# Patient Record
Sex: FEMALE | Race: WHITE | NOT HISPANIC OR LATINO | Employment: STUDENT | URBAN - METROPOLITAN AREA
[De-identification: names, ages, dates, MRNs, and addresses within clinical notes are randomized per-mention and may not be internally consistent; named-entity substitution may affect disease eponyms.]

---

## 2024-02-08 ENCOUNTER — LAB (OUTPATIENT)
Dept: LAB | Facility: HOSPITAL | Age: 19
End: 2024-02-08
Payer: COMMERCIAL

## 2024-02-08 ENCOUNTER — OFFICE VISIT (OUTPATIENT)
Dept: FAMILY MEDICINE CLINIC | Facility: CLINIC | Age: 19
End: 2024-02-08
Payer: COMMERCIAL

## 2024-02-08 VITALS
BODY MASS INDEX: 24.14 KG/M2 | HEIGHT: 67 IN | TEMPERATURE: 98.3 F | HEART RATE: 74 BPM | SYSTOLIC BLOOD PRESSURE: 114 MMHG | OXYGEN SATURATION: 98 % | DIASTOLIC BLOOD PRESSURE: 76 MMHG | WEIGHT: 153.8 LBS

## 2024-02-08 DIAGNOSIS — F41.9 ANXIETY: ICD-10-CM

## 2024-02-08 DIAGNOSIS — F41.9 ANXIETY: Primary | ICD-10-CM

## 2024-02-08 DIAGNOSIS — G43.009 MIGRAINE WITHOUT AURA AND WITHOUT STATUS MIGRAINOSUS, NOT INTRACTABLE: ICD-10-CM

## 2024-02-08 LAB
ALBUMIN SERPL-MCNC: 4.2 G/DL (ref 3.5–5.2)
ALBUMIN/GLOB SERPL: 1.6 G/DL
ALP SERPL-CCNC: 41 U/L (ref 39–117)
ALT SERPL W P-5'-P-CCNC: 15 U/L (ref 1–33)
ANION GAP SERPL CALCULATED.3IONS-SCNC: 11.3 MMOL/L (ref 5–15)
AST SERPL-CCNC: 18 U/L (ref 1–32)
BILIRUB SERPL-MCNC: 0.4 MG/DL (ref 0–1.2)
BUN SERPL-MCNC: 11 MG/DL (ref 6–20)
BUN/CREAT SERPL: 15.7 (ref 7–25)
CALCIUM SPEC-SCNC: 9 MG/DL (ref 8.6–10.5)
CHLORIDE SERPL-SCNC: 106 MMOL/L (ref 98–107)
CO2 SERPL-SCNC: 20.7 MMOL/L (ref 22–29)
CREAT SERPL-MCNC: 0.7 MG/DL (ref 0.57–1)
EGFRCR SERPLBLD CKD-EPI 2021: 127.2 ML/MIN/1.73
GLOBULIN UR ELPH-MCNC: 2.6 GM/DL
GLUCOSE SERPL-MCNC: 80 MG/DL (ref 65–99)
POTASSIUM SERPL-SCNC: 3.9 MMOL/L (ref 3.5–5.2)
PROT SERPL-MCNC: 6.8 G/DL (ref 6–8.5)
SODIUM SERPL-SCNC: 138 MMOL/L (ref 136–145)

## 2024-02-08 PROCEDURE — 80053 COMPREHEN METABOLIC PANEL: CPT

## 2024-02-08 PROCEDURE — 99204 OFFICE O/P NEW MOD 45 MIN: CPT

## 2024-02-08 RX ORDER — ESCITALOPRAM OXALATE 10 MG/1
10 TABLET ORAL DAILY
Qty: 30 TABLET | Refills: 3 | Status: SHIPPED | OUTPATIENT
Start: 2024-02-08

## 2024-02-08 RX ORDER — SUMATRIPTAN 25 MG/1
25 TABLET, FILM COATED ORAL
Qty: 9 TABLET | Refills: 3 | Status: SHIPPED | OUTPATIENT
Start: 2024-02-08

## 2024-02-08 NOTE — PROGRESS NOTES
"     New Patient Office Visit      Date: 2024   Patient Name: Елена Dubose  : 2005   MRN: [id number]     Chief Complaint:    Chief Complaint   Patient presents with    Anxiety    New Patient Preventive Medicine Services    Migraine       History of Present Illness: Елена Dubose is a 19 y.o. female who is here today to establish care.  Patient presents today accompanied by her mother.  She would like to discuss starting anxiety medication.  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  Patient states she is always struggled with anxiety.  Denies any history of self-harm or harming others.  She states that she has noticed an increase in her anxiety due to school.  She states that she has had a hard time with sleeping and has not been able to fall asleep till 4 5 in the morning because of racing thoughts.  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  No other excessive caffeine use.  Patient was originally opposed to starting medication for anxiety, but now feels like she needs it due to it affecting her sleep and daily life.    Patient also complains of migraines.  She states that she will get migraines for a couple months at a time every 6 to 7 months.  She states that usually during the start of a semester her school year.  She states that when she does have these episodes for 1 month she will get about 3 migraines per week.  She states she does have some light sensitivity.  She states loud noises make her headaches worse.  Patient denies any associated nausea or vomiting.  Patient's mom states that she also has migraines and currently is on Elavil for prevention.  She states she has given her daughter, the patient, some sumatriptan and of hers before and it has helped take " the pain away.  Patient states that she has had palpitations in the past, but was worked up by cardiology and was assumed that likely it is related to her anxiety.          Subjective      Review of Systems:   Review of Systems   Constitutional:  Negative for chills and fever.   HENT:  Negative for congestion and hearing loss.    Respiratory:  Negative for cough, chest tightness and shortness of breath.    Cardiovascular:  Negative for chest pain and palpitations.   Gastrointestinal:  Negative for abdominal distention and abdominal pain.   Genitourinary:  Negative for menstrual problem.   Neurological:  Positive for headache. Negative for dizziness.   Psychiatric/Behavioral:  Positive for sleep disturbance. Negative for self-injury. The patient is nervous/anxious.        Past Medical History:   Past Medical History:   Diagnosis Date    Headache     Urinary tract infection        Past Surgical History:   Past Surgical History:   Procedure Laterality Date    WISDOM TOOTH EXTRACTION         Family History:   Family History   Problem Relation Age of Onset    Migraines Mother     Anxiety disorder Mother     Anxiety disorder Father        Social History:   Social History     Socioeconomic History    Marital status: Single   Tobacco Use    Smoking status: Never     Passive exposure: Never    Smokeless tobacco: Never   Vaping Use    Vaping Use: Never used   Substance and Sexual Activity    Alcohol use: Not Currently     Alcohol/week: 1.0 standard drink of alcohol     Types: 1 Glasses of wine per week    Drug use: Never    Sexual activity: Yes     Partners: Male       Medications:     Current Outpatient Medications:     adapalene (DIFFERIN) 0.3 % gel, Apply 1 application topically to the entire face as directed Daily., Disp: 45 g, Rfl: 3    benzoyl peroxide 5 % external liquid, Apply 1 application topically to the face, back & chest as directed when showering., Disp: 148 g, Rfl: 3    norethindrone-ethinyl estradiol-iron  "(Blisovi Fe 1.5/30) 1.5-30 MG-MCG tablet, Take 1 tablet by mouth Daily., Disp: 84 tablet, Rfl: 4    spironolactone (ALDACTONE) 50 MG tablet, Take 1 tablet by mouth in the morning and 1 tablet by mouth at night., Disp: 60 tablet, Rfl: 5    escitalopram (Lexapro) 10 MG tablet, Take 1 tablet by mouth Daily., Disp: 30 tablet, Rfl: 3    SUMAtriptan (IMITREX) 25 MG tablet, Take 1 tablet by mouth Every 2 Hours As Needed for Migraine.  *MAX:  200mg (8 tablets) per day*, Disp: 9 tablet, Rfl: 3    Allergies:   No Known Allergies    Objective     Physical Exam:  Vital Signs:   Vitals:    02/08/24 0829   BP: 114/76   BP Location: Right arm   Patient Position: Sitting   Cuff Size: Adult   Pulse: 74   Temp: 98.3 °F (36.8 °C)   TempSrc: Oral   SpO2: 98%   Weight: 69.8 kg (153 lb 12.8 oz)   Height: 170.2 cm (67\")   PainSc: 0-No pain     Body mass index is 24.09 kg/m².  BMI is within normal parameters. No other follow-up for BMI required.       Physical Exam  Constitutional:       Appearance: Normal appearance. She is not toxic-appearing.   HENT:      Head: Normocephalic and atraumatic.      Right Ear: Tympanic membrane, ear canal and external ear normal.      Left Ear: Tympanic membrane, ear canal and external ear normal.      Nose: Nose normal.      Mouth/Throat:      Mouth: Mucous membranes are moist.      Pharynx: No posterior oropharyngeal erythema.   Eyes:      Extraocular Movements: Extraocular movements intact.      Pupils: Pupils are equal, round, and reactive to light.   Cardiovascular:      Rate and Rhythm: Normal rate and regular rhythm.      Pulses: Normal pulses.      Heart sounds: Normal heart sounds.   Pulmonary:      Effort: Pulmonary effort is normal.      Breath sounds: Normal breath sounds. No wheezing.   Abdominal:      General: Abdomen is flat. Bowel sounds are normal.      Tenderness: There is no abdominal tenderness.   Musculoskeletal:      Cervical back: Normal range of motion.   Lymphadenopathy:      " Cervical: No cervical adenopathy.   Skin:     General: Skin is warm.   Neurological:      General: No focal deficit present.      Mental Status: She is alert and oriented to person, place, and time.   Psychiatric:         Mood and Affect: Mood normal.         Procedures    Results:   PHQ-9 Total Score: 0            Assessment / Plan      Assessment/Plan:   Diagnoses and all orders for this visit:    1. Anxiety (Primary)  Assessment & Plan:  Start Lexapro 10 mg once daily.  Patient was counseled on risks and benefits of medication.  Would like patient to return in about 2 months for her annual exam and to discuss how the medication is working for her.    Orders:  -     escitalopram (Lexapro) 10 MG tablet; Take 1 tablet by mouth Daily.  Dispense: 30 tablet; Refill: 3  -     Comprehensive Metabolic Panel; Future    2. Migraine without aura and without status migrainosus, not intractable  Assessment & Plan:  Headaches are worsening.  Medication changes per orders.  Follow up in 2 months, or sooner should new symptoms or problems arise.  Patient counseled on risk and benefits of taking sumatriptan.  Discussed she may take it once every 2 hours as needed with a max dose of 200 mg/day.     Orders:  -     SUMAtriptan (IMITREX) 25 MG tablet; Take 1 tablet by mouth Every 2 Hours As Needed for Migraine.  *MAX:  200mg (8 tablets) per day*  Dispense: 9 tablet; Refill: 3         Follow Up:   Return in about 2 months (around 4/8/2024) for Annual.    Tawana Morris PA-C   [facility]

## 2024-02-08 NOTE — ASSESSMENT & PLAN NOTE
Start Lexapro 10 mg once daily.  Patient was counseled on risks and benefits of medication.  Would like patient to return in about 2 months for her annual exam and to discuss how the medication is working for her.

## 2024-02-08 NOTE — ASSESSMENT & PLAN NOTE
Headaches are worsening.  Medication changes per orders.  Follow up in 2 months, or sooner should new symptoms or problems arise.  Patient counseled on risk and benefits of taking sumatriptan.  Discussed she may take it once every 2 hours as needed with a max dose of 200 mg/day.

## 2024-02-09 ENCOUNTER — TELEPHONE (OUTPATIENT)
Dept: FAMILY MEDICINE CLINIC | Facility: CLINIC | Age: 19
End: 2024-02-09
Payer: COMMERCIAL

## 2024-02-09 NOTE — TELEPHONE ENCOUNTER
..Attempted to contact patient no answer unable to leave vm due to the mailbox being full    ...HUB MAY RELAY MESSAGE AND DOCUMENT   Per Tawana mei Labs within normal limits

## 2024-05-03 ENCOUNTER — OFFICE VISIT (OUTPATIENT)
Dept: FAMILY MEDICINE CLINIC | Facility: CLINIC | Age: 19
End: 2024-05-03
Payer: COMMERCIAL

## 2024-05-03 VITALS
WEIGHT: 156 LBS | SYSTOLIC BLOOD PRESSURE: 116 MMHG | HEART RATE: 86 BPM | OXYGEN SATURATION: 96 % | DIASTOLIC BLOOD PRESSURE: 82 MMHG | HEIGHT: 67 IN | BODY MASS INDEX: 24.48 KG/M2

## 2024-05-03 DIAGNOSIS — F41.9 ANXIETY: Primary | ICD-10-CM

## 2024-05-03 PROCEDURE — 99213 OFFICE O/P EST LOW 20 MIN: CPT

## 2024-05-03 NOTE — PROGRESS NOTES
Office Note     Name: Елена Dubose    : 2005     MRN: [id number]     Chief Complaint  Anxiety (F/u medication helps alot)    Subjective     History of Present Illness:  Елена Dubose is a 19 y.o. female who presents today for follow up on anxiety medication.  Patient was started on Lexapro at her last visit for her anxiety.  Patient states that the medicine has helped significantly.  She denies any significant side effects or complaints today.  She overall feels well and has no questions or concerns.  She states that she will be done with school next week and she is looking forward to being out for the summer.  Denies SI or HI.  She states she has 1 more refill for the Lexapro.  Does not feel like she needs to increase her dose at all.    Review of Systems:   Review of Systems   Constitutional:  Negative for chills, fatigue and fever.   HENT:  Negative for congestion.    Respiratory:  Negative for cough and shortness of breath.    Cardiovascular:  Negative for chest pain.   Gastrointestinal:  Negative for abdominal pain.   Neurological:  Negative for weakness, light-headedness and memory problem.   Psychiatric/Behavioral:  Negative for suicidal ideas and depressed mood. The patient is not nervous/anxious.        Past Medical History:   Past Medical History:   Diagnosis Date    Headache     Urinary tract infection        Past Surgical History:   Past Surgical History:   Procedure Laterality Date    WISDOM TOOTH EXTRACTION         Family History:   Family History   Problem Relation Age of Onset    Migraines Mother     Anxiety disorder Mother     Anxiety disorder Father        Social History:   Social History     Socioeconomic History    Marital status: Single   Tobacco Use    Smoking status: Never     Passive exposure: Never    Smokeless tobacco: Never   Vaping Use    Vaping status: Never Used   Substance and Sexual Activity    Alcohol use: Not Currently     Alcohol/week: 1.0 standard drink of alcohol      "Types: 1 Glasses of wine per week    Drug use: Never    Sexual activity: Yes     Partners: Male       Immunizations:   There is no immunization history on file for this patient.     Medications:     Current Outpatient Medications:     adapalene (DIFFERIN) 0.3 % gel, Apply 1 application topically to the entire face as directed Daily., Disp: 45 g, Rfl: 3    benzoyl peroxide 5 % external liquid, Apply 1 application topically to the face, back & chest as directed when showering., Disp: 148 g, Rfl: 3    escitalopram (Lexapro) 10 MG tablet, Take 1 tablet by mouth Daily., Disp: 30 tablet, Rfl: 3    norethindrone-ethinyl estradiol-iron (Blisovi Fe 1.5/30) 1.5-30 MG-MCG tablet, Take 1 tablet by mouth Daily., Disp: 84 tablet, Rfl: 4    spironolactone (ALDACTONE) 50 MG tablet, Take 2 tablets by mouth Daily., Disp: 60 tablet, Rfl: 6    SUMAtriptan (IMITREX) 25 MG tablet, Take 1 tablet by mouth Every 2 Hours As Needed for Migraine.  *MAX:  200mg (8 tablets) per day*, Disp: 9 tablet, Rfl: 3    Allergies:   No Known Allergies    Objective     Vital Signs  /82   Pulse 86   Ht 170.2 cm (67\")   Wt 70.8 kg (156 lb)   SpO2 96%   BMI 24.43 kg/m²   Estimated body mass index is 24.43 kg/m² as calculated from the following:    Height as of this encounter: 170.2 cm (67\").    Weight as of this encounter: 70.8 kg (156 lb).    BMI is within normal parameters. No other follow-up for BMI required.       Physical Exam  Vitals reviewed.   Constitutional:       Appearance: Normal appearance.   HENT:      Head: Normocephalic and atraumatic.   Eyes:      Pupils: Pupils are equal, round, and reactive to light.   Cardiovascular:      Rate and Rhythm: Normal rate and regular rhythm.      Pulses: Normal pulses.      Heart sounds: Normal heart sounds.   Pulmonary:      Effort: Pulmonary effort is normal.      Breath sounds: Normal breath sounds.   Abdominal:      General: Abdomen is flat. Bowel sounds are normal.   Skin:     General: Skin is " warm.   Neurological:      General: No focal deficit present.      Mental Status: She is alert and oriented to person, place, and time.   Psychiatric:         Mood and Affect: Mood normal.            Results:  No results found for this or any previous visit (from the past 24 hour(s)).     Assessment and Plan     Assessment/Plan:  Diagnoses and all orders for this visit:    1. Anxiety (Primary)  Assessment & Plan:  Anxiety improving with medication.  Continue on current regimen.  Follow-up in 6 months.          Follow Up  Return in about 6 months (around 11/3/2024) for Annual.    Tawana Morris PA-C   [facility]   64.4

## 2024-08-18 DIAGNOSIS — G43.009 MIGRAINE WITHOUT AURA AND WITHOUT STATUS MIGRAINOSUS, NOT INTRACTABLE: ICD-10-CM

## 2024-08-19 RX ORDER — SUMATRIPTAN 25 MG/1
25 TABLET, FILM COATED ORAL
Qty: 9 TABLET | Refills: 3 | Status: SHIPPED | OUTPATIENT
Start: 2024-08-19

## 2024-08-19 NOTE — TELEPHONE ENCOUNTER
Rx Refill Note  Requested Prescriptions     Pending Prescriptions Disp Refills    SUMAtriptan (IMITREX) 25 MG tablet 9 tablet 3     Sig: Take 1 tablet by mouth Every 2 Hours As Needed for Migraine.  *MAX:  200mg (8 tablets) per day*      Last office visit with prescribing clinician: 5/3/2024   Last telemedicine visit with prescribing clinician: Visit date not found   Next office visit with prescribing clinician: Visit date not found                         Would you like a call back once the refill request has been completed: [] Yes [] No    If the office needs to give you a call back, can they leave a voicemail: [] Yes [] No    Lizzeth Dunham MA  08/19/24, 12:09 EDT

## 2024-12-13 ENCOUNTER — TELEPHONE (OUTPATIENT)
Dept: FAMILY MEDICINE CLINIC | Facility: CLINIC | Age: 19
End: 2024-12-13

## 2024-12-13 ENCOUNTER — HOSPITAL ENCOUNTER (EMERGENCY)
Facility: HOSPITAL | Age: 19
Discharge: HOME | End: 2024-12-13
Attending: EMERGENCY MEDICINE
Payer: COMMERCIAL

## 2024-12-13 VITALS
RESPIRATION RATE: 16 BRPM | HEART RATE: 104 BPM | BODY MASS INDEX: 23.49 KG/M2 | DIASTOLIC BLOOD PRESSURE: 102 MMHG | HEIGHT: 68 IN | SYSTOLIC BLOOD PRESSURE: 142 MMHG | OXYGEN SATURATION: 99 % | WEIGHT: 155 LBS | TEMPERATURE: 98.1 F

## 2024-12-13 DIAGNOSIS — R68.84 JAW PAIN: ICD-10-CM

## 2024-12-13 DIAGNOSIS — M26.609 TMJ (TEMPOROMANDIBULAR JOINT DISORDER): Primary | ICD-10-CM

## 2024-12-13 DIAGNOSIS — R25.2 TRISMUS: ICD-10-CM

## 2024-12-13 PROCEDURE — 99282 EMERGENCY DEPT VISIT SF MDM: CPT

## 2024-12-13 RX ORDER — IBUPROFEN 600 MG/1
600 TABLET, FILM COATED ORAL 3 TIMES DAILY
Qty: 12 TABLET | Refills: 0 | Status: SHIPPED | OUTPATIENT
Start: 2024-12-13

## 2024-12-13 RX ORDER — CYCLOBENZAPRINE HCL 10 MG
10 TABLET ORAL 3 TIMES DAILY PRN
Qty: 12 TABLET | Refills: 0 | Status: SHIPPED | OUTPATIENT
Start: 2024-12-13

## 2024-12-13 NOTE — ED PROVIDER NOTES
Subjective   History of Present Illness  Patient is a pleasant 19-year-old female presents to the emergency department with right jaw pain and decreased mobility of her mandible secondary to this discomfort.  She noticed this morning there is some discomfort in the right jawline when she would move her mandible back-and-forth it would exacerbate this pain.  Normally when this happened in the past she has been able to rest her jaw and take a nap and when she would wake up it would be better.  Today it was not improved and this prompted her visit to the emergency department.  There is been no dislocation or inability to close her mouth.  She can open her mouth slightly more than shelter and has been able to eat or drink but when she tried to eat a corn dog earlier she was not able to open mandible wide enough and again needs combination of symptoms what brings her to the emergency department.  Denies fever, chills, chest pain, shortness of breath, abdominal pain, vomiting, diarrhea, or other acute complaint.        Review of Systems   All other systems reviewed and are negative.      Past Medical History:   Diagnosis Date    Headache     Urinary tract infection        No Known Allergies    Past Surgical History:   Procedure Laterality Date    WISDOM TOOTH EXTRACTION         Family History   Problem Relation Age of Onset    Migraines Mother     Anxiety disorder Mother     Anxiety disorder Father        Social History     Socioeconomic History    Marital status: Single   Tobacco Use    Smoking status: Never     Passive exposure: Never    Smokeless tobacco: Never   Vaping Use    Vaping status: Never Used   Substance and Sexual Activity    Alcohol use: Not Currently     Alcohol/week: 1.0 standard drink of alcohol     Types: 1 Glasses of wine per week    Drug use: Never    Sexual activity: Yes     Partners: Male           Objective   Physical Exam  Vitals and nursing note reviewed.   Constitutional:       General: She is  "not in acute distress.     Appearance: Normal appearance. She is not ill-appearing.   HENT:      Head: Normocephalic and atraumatic.      Mouth/Throat:      Comments: I am able to passively open and close the mouth.  She does have some discomfort when attempting to fully open the mouth.  After lying with relation symptoms are slightly improved but discomfort persist.  Pain is very mild pain and primarily with movement and especially lateral movements of the mandible.  Eyes:      Conjunctiva/sclera: Conjunctivae normal.      Pupils: Pupils are equal, round, and reactive to light.   Neck:      Thyroid: No thyromegaly.      Comments: No lymphadenopathy  Pulmonary:      Effort: Pulmonary effort is normal. No respiratory distress.   Musculoskeletal:         General: Normal range of motion.      Cervical back: Normal range of motion and neck supple.   Lymphadenopathy:      Cervical: No cervical adenopathy.   Skin:     General: Skin is warm and dry.   Neurological:      Mental Status: She is alert and oriented to person, place, and time.   Psychiatric:         Behavior: Behavior normal.         Procedures           ED Course      No results found for this or any previous visit (from the past 24 hours).  Note: In addition to lab results from this visit, the labs listed above may include labs taken at another facility or during a different encounter within the last 24 hours. Please correlate lab times with ED admission and discharge times for further clarification of the services performed during this visit.    No orders to display     Vitals:    12/13/24 1706   BP: (!) 142/102   BP Location: Left arm   Patient Position: Sitting   Pulse: 104   Resp: 16   Temp: 98.1 °F (36.7 °C)   TempSrc: Oral   SpO2: 99%   Weight: 70.3 kg (155 lb)   Height: 172.7 cm (68\")     Medications - No data to display  ECG/EMG Results (last 24 hours)       ** No results found for the last 24 hours. **          No orders to display                     " "                                   Medical Decision Making          No orders to display  ---------------------------               12/13/24                      1706         ---------------------------   BP:        (!) 142/102      BP Location:    Left arm        Patient Position:     Sitting        Pulse:         104          Resp:          16           Temp:   98.1 °F (36.7 °C)   TempSrc:      Oral          SpO2:          99%          Weight: 70.3 kg (155 lb)    Height:  172.7 cm (68\")    ---------------------------  Medications - No data to display  ECG/EMG Results (last 24 hours)     ** No results found for the last 24 hours. **      No orders to display          Problems Addressed:  Jaw pain: complicated acute illness or injury  TMJ (temporomandibular joint disorder): complicated acute illness or injury  Trismus: complicated acute illness or injury    Risk  Prescription drug management.        Final diagnoses:   TMJ (temporomandibular joint disorder)   Jaw pain   Trismus       ED Disposition  ED Disposition       ED Disposition   Discharge    Condition   Stable    Comment   --           DISCHARGE    Patient discharged in stable condition.    Reviewed implications of results, diagnosis, meds, responsibility to follow up, warning signs and symptoms of possible worsening, potential complications and reasons to return to ER.    Patient/Family voiced understanding of above instructions.    Discussed plan for discharge, as there is no emergent indication for admission.  Pt/family is agreeable and understands need for follow up and possible repeat testing.  Pt/family is aware that discharge does not mean that nothing is wrong but that it indicates no emergency is currently present that requires admission and they must continue care with follow-up as given below or with a physician of their choice.     FOLLOW-UP  Car Starks MD  [address] [facility] [address]  [place] [address]  [phone]    Schedule an appointment as soon as possible for a visit       Tawana Morris PA-C  [address]  [place] [address]  [phone]    Schedule an appointment as soon as possible for a visit       [facility] EMERGENCY DEPARTMENT  [address]  [facility] [id number] [phone]    If symptoms worsen         Medication List        New Prescriptions      cyclobenzaprine 10 MG tablet  Commonly known as: FLEXERIL  Take 1 tablet by mouth 3 (Three) Times a Day As Needed for Muscle Spasms.     ibuprofen 600 MG tablet  Commonly known as: ADVIL,MOTRIN  Take 1 tablet by mouth 3 (Three) Times a Day.               Where to Get Your Medications        These medications were sent to [id number] - [place], [place] - [address] AT [facility] [address] - [phone]  - [phone]   [address], [facility] [address]      Phone: [phone]   cyclobenzaprine 10 MG tablet  ibuprofen 600 MG tablet            Julius Burroughs DO  12/14/24 1640

## 2024-12-13 NOTE — TELEPHONE ENCOUNTER
PATIENT HAS CALLED AND STATED SHE HAS BEEN EXPERIENCING LOCK JAW FOR THE PAST 8 HOURS AND IS REQUESTING A CALL BACK ASAP TO ADVISE ON WHAT TO DO.     CALL BACK NUMBER [phone]

## 2024-12-13 NOTE — TELEPHONE ENCOUNTER
Patient has been unable to open her jaw more than 1 inch all day.  Not able to get food in.  Advised taking care at the emergency room if it has been going on for this long.

## 2024-12-15 ENCOUNTER — HOSPITAL ENCOUNTER (EMERGENCY)
Facility: HOSPITAL | Age: 19
Discharge: HOME | End: 2024-12-15
Attending: EMERGENCY MEDICINE | Admitting: EMERGENCY MEDICINE
Payer: COMMERCIAL

## 2024-12-15 VITALS
WEIGHT: 155 LBS | HEART RATE: 81 BPM | OXYGEN SATURATION: 100 % | SYSTOLIC BLOOD PRESSURE: 130 MMHG | RESPIRATION RATE: 14 BRPM | HEIGHT: 68 IN | DIASTOLIC BLOOD PRESSURE: 84 MMHG | TEMPERATURE: 98.8 F | BODY MASS INDEX: 23.49 KG/M2

## 2024-12-15 DIAGNOSIS — R25.2 TRISMUS: ICD-10-CM

## 2024-12-15 DIAGNOSIS — R68.84 JAW PAIN: Primary | ICD-10-CM

## 2024-12-15 DIAGNOSIS — Z98.890 HISTORY OF RECENT DENTAL PROCEDURE: ICD-10-CM

## 2024-12-15 DIAGNOSIS — M26.609 TMJ (TEMPOROMANDIBULAR JOINT SYNDROME): ICD-10-CM

## 2024-12-15 PROCEDURE — 99282 EMERGENCY DEPT VISIT SF MDM: CPT

## 2024-12-15 RX ORDER — PENICILLIN V POTASSIUM 500 MG/1
500 TABLET, FILM COATED ORAL 4 TIMES DAILY
Qty: 40 TABLET | Refills: 0 | Status: SHIPPED | OUTPATIENT
Start: 2024-12-15 | End: 2024-12-25

## 2024-12-16 NOTE — ED PROVIDER NOTES
Subjective   History of Present Illness  Pt is a 20 yo female presenting to ED with complaints of jaw problem PMHx significant for headaches. Pt explains she has had difficulty fully opening mouth for the past 3-4 days. She did have dental procedure prior to onset. She came to ED 12-13-24 and given Flexeril and Motrin but hasn't seen improvement. She just noticed soreness to jaw today but denies swelling. She can fully close her mouth just not open all the way. She denies drooling, difficulty swallowing, headache, dizziness, neck pain / swelling or fevers. She has not been on antibiotics or followed up with ENT / dentist.     History provided by:  Patient, medical records and relative      Review of Systems   Constitutional:  Negative for fever.   HENT:  Positive for dental problem. Negative for congestion, drooling, ear pain, facial swelling, sinus pain, sore throat and trouble swallowing.         Right jaw pain     Eyes:  Negative for visual disturbance.   Musculoskeletal:  Negative for neck pain.   Neurological:  Negative for dizziness and headaches.       Past Medical History:   Diagnosis Date    Headache     Urinary tract infection        No Known Allergies    Past Surgical History:   Procedure Laterality Date    WISDOM TOOTH EXTRACTION         Family History   Problem Relation Age of Onset    Migraines Mother     Anxiety disorder Mother     Anxiety disorder Father        Social History     Socioeconomic History    Marital status: Single   Tobacco Use    Smoking status: Never     Passive exposure: Never    Smokeless tobacco: Never   Vaping Use    Vaping status: Never Used   Substance and Sexual Activity    Alcohol use: Not Currently     Alcohol/week: 1.0 standard drink of alcohol     Types: 1 Glasses of wine per week    Drug use: Never    Sexual activity: Yes     Partners: Male           Objective   Physical Exam  Vitals and nursing note reviewed.   Constitutional:       General: She is not in acute  "distress.  HENT:      Head: Atraumatic.      Jaw: Trismus and tenderness (mild right) present. No swelling.      Nose: Nose normal.      Mouth/Throat:      Mouth: Mucous membranes are moist. No oral lesions or angioedema.      Dentition: Normal dentition. No dental tenderness or dental abscesses.   Eyes:      Extraocular Movements: Extraocular movements intact.      Conjunctiva/sclera: Conjunctivae normal.   Cardiovascular:      Rate and Rhythm: Normal rate.   Pulmonary:      Effort: Pulmonary effort is normal. No respiratory distress.   Musculoskeletal:         General: Normal range of motion.      Cervical back: Normal range of motion and neck supple.   Skin:     General: Skin is warm.   Neurological:      General: No focal deficit present.      Mental Status: She is alert and oriented to person, place, and time.   Psychiatric:         Mood and Affect: Mood normal.         Behavior: Behavior normal.         Procedures           ED Course  ED Course as of 12/15/24 2033   Sun Dec 15, 2024   1420 Discussed tx plan with patient. Due to recent dental procedure and now having pain will cover with PCN. She will f/u with dentist and ENT. Went over new /worse sx to return to ED.  [RT]      ED Course User Index  [RT] Latoya Palafox PA        No results found for this or any previous visit (from the past 24 hours).  Note: In addition to lab results from this visit, the labs listed above may include labs taken at another facility or during a different encounter within the last 24 hours. Please correlate lab times with ED admission and discharge times for further clarification of the services performed during this visit.    No orders to display     Vitals:    12/15/24 1416   BP: 130/84   Pulse: 81   Resp: 14   Temp: 98.8 °F (37.1 °C)   TempSrc: Oral   SpO2: 100%   Weight: 70.3 kg (155 lb)   Height: 172.7 cm (68\")     Medications - No data to display  ECG/EMG Results (last 24 hours)       ** No results found for the last 24 " hours. **          No orders to display       DISCHARGE    Patient discharged in stable condition.    Reviewed implications of results, diagnosis, meds, responsibility to follow up, warning signs and symptoms of possible worsening, potential complications and reasons to return to ER.    Patient/Family voiced understanding of above instructions.    Discussed plan for discharge, as there is no emergent indication for admission.  Pt/family is agreeable and understands need for follow up and possible repeat testing.  Pt/family is aware that discharge does not mean that nothing is wrong but that it indicates no emergency is currently present that requires admission and they must continue care with follow-up as given below or with a physician of their choice.     FOLLOW-UP  Tawana Morris PA-C  [address] [address]  [phone]    Schedule an appointment as soon as possible for a visit       Car Starks MD  [address] [facility] [address]  [place] [address]  [phone]    Schedule an appointment as soon as possible for a visit         Dentist  Schedule an appointment as soon as possible for a visit       [facility] EMERGENCY DEPARTMENT  [address] [id number] [phone]    If symptoms worsen         Medication List        New Prescriptions      penicillin v potassium 500 MG tablet  Commonly known as: VEETID  Take 1 tablet by mouth 4 (Four) Times a Day for 10 days.               Where to Get Your Medications        These medications were sent to [facility] [place]  [address] [place] [address] [address] [place] [address]      Hours: Monday to Friday 7 AM to 5:30 PM, Saturday & Sunday 8 AM to 4:30 PM Phone: [phone]   penicillin v potassium 500 MG tablet                                                      Medical Decision Making  Pt is a 18 yo female presenting to ED with complaints of difficulty fully opening jaw and  pain. I had a discussion with the patient / family regarding ED course, diagnosis, diagnostic results and treatment plan including medications and admission / discharge. Discussed if new or worse symptoms / concerns to return to ED for further evaluation. Discussed need for close follow up with PCP / specialists.    DDx  Dental infection, TMJ, Trismus, Airway obstruction     Problems Addressed:  History of recent dental procedure: complicated acute illness or injury  Jaw pain: complicated acute illness or injury  TMJ (temporomandibular joint syndrome): complicated acute illness or injury  Trismus: complicated acute illness or injury    Amount and/or Complexity of Data Reviewed  Independent Historian: parent  External Data Reviewed: notes.     Details: Reviewed previous non ED visits including prior labs, imaging, available notes, medications, allergies and surgical hx.   PCP 5-2024    Risk  Prescription drug management.        Final diagnoses:   Jaw pain   History of recent dental procedure   Trismus   TMJ (temporomandibular joint syndrome)       ED Disposition  ED Disposition       ED Disposition   Discharge    Condition   Stable    Comment   --               Tawana Morris, PAMiloC  [address] [address]  [phone]    Schedule an appointment as soon as possible for a visit       Car Starks MD  [address] [facility] [address]  [place] [address]  [phone]    Schedule an appointment as soon as possible for a visit         Dentist  Schedule an appointment as soon as possible for a visit       [facility] EMERGENCY DEPARTMENT  [address] [facility] [address] [phone]    If symptoms worsen         Medication List        New Prescriptions      penicillin v potassium 500 MG tablet  Commonly known as: VEETID  Take 1 tablet by mouth 4 (Four) Times a Day for 10 days.               Where to Get Your Medications        These medications were sent to  [facility] [place] [address]      Hours: Monday to Friday 7 AM to 5:30 PM, Saturday & Sunday 8 AM to 4:30 PM Phone: [phone]   penicillin v potassium 500 MG tablet            Latoya Palafox PA  12/15/24 2038